# Patient Record
Sex: FEMALE | Race: OTHER | NOT HISPANIC OR LATINO | URBAN - METROPOLITAN AREA
[De-identification: names, ages, dates, MRNs, and addresses within clinical notes are randomized per-mention and may not be internally consistent; named-entity substitution may affect disease eponyms.]

---

## 2017-01-18 ENCOUNTER — EMERGENCY (EMERGENCY)
Facility: HOSPITAL | Age: 57
LOS: 1 days | Discharge: HOME | End: 2017-01-18
Attending: EMERGENCY MEDICINE
Payer: SELF-PAY

## 2017-01-18 VITALS
WEIGHT: 141.98 LBS | DIASTOLIC BLOOD PRESSURE: 61 MMHG | HEART RATE: 65 BPM | TEMPERATURE: 98 F | HEIGHT: 61 IN | RESPIRATION RATE: 18 BRPM | SYSTOLIC BLOOD PRESSURE: 144 MMHG | OXYGEN SATURATION: 100 %
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
## 2017-01-18 DIAGNOSIS — M54.9 DORSALGIA, UNSPECIFIED: ICD-10-CM

## 2017-01-18 DIAGNOSIS — I10 ESSENTIAL (PRIMARY) HYPERTENSION: ICD-10-CM
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
## 2017-01-18 DIAGNOSIS — M54.2 CERVICALGIA: ICD-10-CM

## 2017-01-18 DIAGNOSIS — M54.5 LOW BACK PAIN: ICD-10-CM
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
## 2017-01-18 PROCEDURE — 73502 X-RAY EXAM HIP UNI 2-3 VIEWS: CPT

## 2017-01-18 PROCEDURE — 72125 CT NECK SPINE W/O DYE: CPT

## 2017-01-18 PROCEDURE — 72125 CT NECK SPINE W/O DYE: CPT | Mod: 26

## 2017-01-18 PROCEDURE — 99283 EMERGENCY DEPT VISIT LOW MDM: CPT

## 2017-01-18 PROCEDURE — 70450 CT HEAD/BRAIN W/O DYE: CPT

## 2017-01-18 PROCEDURE — 70450 CT HEAD/BRAIN W/O DYE: CPT | Mod: 26

## 2017-01-18 PROCEDURE — 73502 X-RAY EXAM HIP UNI 2-3 VIEWS: CPT | Mod: 26,LT

## 2017-01-18 PROCEDURE — 99284 EMERGENCY DEPT VISIT MOD MDM: CPT | Mod: 25

## 2017-01-18 NOTE — ED PROVIDER NOTE - OBJECTIVE STATEMENT
57 y/o F pt with PMHx of HTN and chronic back pain  presents to the ED c/o head injury s/p fall x 1455 today. Pt also notes pain to L neck, L hand, and L lower back radiating to buttock. Pt reports she was at work and when she turned she fell hitting her head on the shower door. Pt is unsure if she tripped on anything she just suddenly fell. Pt was able to ambulate after the fall and took 2 ibuprofen PTA. Pt denies lightheadedness, dizziness, CP, palpitations, nausea, visual changes, blood o clear drainage from nose, blood thinner use or any other complaints at this time. Allergies: sulfacetamide sodium-sulfur 57 y/o F pt with PMHx of HTN and chronic back pain  presents to the ED c/o head injury s/p fall x 1455 today. Pt also notes pain to R neck and L lower back radiating to buttock. Pt reports she was at work and when she turned she fell hitting her head on the shower door. Pt is unsure if she tripped on anything she just suddenly fell. Pt was able to ambulate after the fall and took 2 ibuprofen PTA. Pt denies lightheadedness, dizziness, CP, palpitations, nausea, visual changes, blood o clear drainage from nose, blood thinner use or any other complaints at this time. Allergies: sulfacetamide sodium-sulfur

## 2017-01-18 NOTE — ED PROVIDER NOTE - NS ED MD SCRIBE ATTENDING SCRIBE SECTIONS
VITAL SIGNS( Pullset)/PHYSICAL EXAM/HIV/HISTORY OF PRESENT ILLNESS/DISPOSITION/REVIEW OF SYSTEMS/PAST MEDICAL/SURGICAL/SOCIAL HISTORY

## 2017-01-18 NOTE — ED PROVIDER NOTE - CARE PLAN
Principal Discharge DX:	Neck pain, acute  Secondary Diagnosis:	Acute left-sided low back pain without sciatica

## 2017-01-18 NOTE — ED PROVIDER NOTE - PROGRESS NOTE DETAILS
The scribes documentation has been prepared under my direct and personally reviewed by me in its entirety. I confirm that the note above accurately reflects all work, treatment, procedures, and medical decision making performed by me [Eileen Raphael NP]. Pt ambulating wo assistance at time of discharge, discussed incidental CT neck findings, no acute findings, suggested following up with Ortho/spine, printed out results. Instructed to take IBU OTC x5 days. Daughter at bedside.

## 2017-01-18 NOTE — ED PROVIDER NOTE - PHYSICAL EXAMINATION
MSKL: R paraspinal cervical TTP;  no midline TTP;  L paraspinal lumbar TTP; L side positive log roll; Antalgic gait; difficulty bearing weight/ ambulating without assistannce  HEMNT: occipital point tenderness; no dietrich sign; no raccoon eye; bilateral TMs difficult to see due to cerumen build up

## 2017-01-18 NOTE — ED ADULT TRIAGE NOTE - CHIEF COMPLAINT QUOTE
Head injury s/p fall x 2.55 pm. 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 Also have pain left shoulder, buttock and left hand. Has bump back of head. NO loc.

## 2017-01-18 NOTE — ED PROVIDER NOTE - ATTENDING CONTRIBUTION TO CARE
54 yo F s/p fall at work struck head on ground, deneis LOC, + left tenderness.   GCS 15, no raccoon eyes, no Battles sign, no scalp step off deformities.   left hip TTP,  no bony deformities, no leg length discrepancy, femoral and pedal pulses intact, cap refill  < 2 secs. 56 yo F s/p fall at work struck head on ground, deneis LOC, + left tenderness.   GCS 15, no raccoon eyes, no Battles sign, no scalp step off deformities.   left hip TTP,  no bony deformities, no leg length discrepancy, femoral and pedal pulses intact, cap refill  < 2 secs.  Pt is well appearing walking with normal gait, stable for discharge and follow up with medical doctor. Pt educated on care and need for follow up. Discussed anticipatory guidance and return precautions. Questions answered. I had a detailed discussion with the patient and/or guardian regarding the historical points, exam findings, and any diagnostic results supporting the discharge diagnosis. Rx IBU f/u with PMD.

## 2017-01-18 NOTE — ED PROVIDER NOTE - MEDICAL DECISION MAKING DETAILS
Pt with point occipital TTP, L lumbar pain and neck pain s/p fall of unknown mechanism. Pt unsure of LOC. Pt able to ambulate at the scene with assitance. Pt was given 400mg ibuprofen PTA. Plan to obtain CT head-neck, X-ray of hip to r/o fracture. Pt refusing percocet at this time. No signs of basilar skull fracture. GCS 15 A&O3 Pt with occipital point tenderness, L lumbar pain (no sciatica), neck pain and pain with ambulation s/p mechanical fall, pt uncertain of LOC, witnessed by other employee's. Denies cardiac symptomatology  or syncope. Pt able to ambulate at the scene with assistance. Pt was given 400mg ibuprofen PTA. Plan to obtain CT head-neck, X-ray of hip to r/o fracture. Pt refusing percocet at this time. No signs of basilar skull fracture. GCS 15 A&O3

## 2017-04-02 ENCOUNTER — EMERGENCY (EMERGENCY)
Facility: HOSPITAL | Age: 57
LOS: 1 days | Discharge: HOME | End: 2017-04-02
Attending: EMERGENCY MEDICINE
Payer: SELF-PAY

## 2017-04-02 VITALS
WEIGHT: 134.92 LBS | OXYGEN SATURATION: 98 % | DIASTOLIC BLOOD PRESSURE: 84 MMHG | TEMPERATURE: 98 F | SYSTOLIC BLOOD PRESSURE: 139 MMHG | HEIGHT: 64 IN | RESPIRATION RATE: 16 BRPM | HEART RATE: 61 BPM

## 2017-04-02 DIAGNOSIS — I10 ESSENTIAL (PRIMARY) HYPERTENSION: ICD-10-CM

## 2017-04-02 DIAGNOSIS — Z91.81 HISTORY OF FALLING: ICD-10-CM
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
## 2017-04-02 DIAGNOSIS — Z88.2 ALLERGY STATUS TO SULFONAMIDES: ICD-10-CM

## 2017-04-02 DIAGNOSIS — S49.92XA UNSPECIFIED INJURY OF LEFT SHOULDER AND UPPER ARM, INITIAL ENCOUNTER: ICD-10-CM
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
## 2017-04-02 PROBLEM — M54.9 DORSALGIA, UNSPECIFIED: Chronic | Status: ACTIVE | Noted: 2017-01-18

## 2017-04-02 PROCEDURE — 99283 EMERGENCY DEPT VISIT LOW MDM: CPT

## 2017-04-02 PROCEDURE — 73030 X-RAY EXAM OF SHOULDER: CPT | Mod: 26,LT

## 2017-04-02 PROCEDURE — 70450 CT HEAD/BRAIN W/O DYE: CPT | Mod: 26

## 2017-04-02 PROCEDURE — 73030 X-RAY EXAM OF SHOULDER: CPT

## 2017-04-02 PROCEDURE — 70450 CT HEAD/BRAIN W/O DYE: CPT

## 2017-04-02 PROCEDURE — 72125 CT NECK SPINE W/O DYE: CPT | Mod: 26

## 2017-04-02 PROCEDURE — 72125 CT NECK SPINE W/O DYE: CPT

## 2017-04-02 PROCEDURE — 99284 EMERGENCY DEPT VISIT MOD MDM: CPT | Mod: 25

## 2017-04-02 RX ORDER — IBUPROFEN 200 MG
600 TABLET ORAL ONCE
Qty: 0 | Refills: 0 | Status: COMPLETED | OUTPATIENT
Start: 2017-04-02 | End: 2017-04-02

## 2017-04-02 RX ORDER — IBUPROFEN 200 MG
1 TABLET ORAL
Qty: 20 | Refills: 0 | OUTPATIENT
Start: 2017-04-02 | End: 2017-04-07

## 2017-04-02 RX ORDER — OXYCODONE HYDROCHLORIDE 5 MG/1
1 TABLET ORAL
Qty: 20 | Refills: 0 | OUTPATIENT
Start: 2017-04-02 | End: 2017-04-07

## 2017-04-02 RX ADMIN — Medication 600 MILLIGRAM(S): at 17:15

## 2017-04-02 RX ADMIN — Medication 600 MILLIGRAM(S): at 16:38

## 2017-04-02 NOTE — ED PROVIDER NOTE - CHPI ED SYMPTOMS NEG
no vomiting, CP, SOB, LOC, HA, diaphoresis, shaking, dizziness, visual aura, nuchal rigidity, neck stiffness, urine/bowel incontinence, changes in speech/vision/hearing, numbness, weakness, tingling, use of drugs/ETOH/cigarettes

## 2017-04-02 NOTE — ED PROVIDER NOTE - CONDUCTED A DETAILED DISCUSSION WITH PATIENT AND/OR GUARDIAN REGARDING, MDM
return to ED if symptoms worsen, persist or questions arise/need for outpatient follow-up return to ED if symptoms worsen, persist or questions arise/need for outpatient follow-up/radiology results

## 2017-04-02 NOTE — ED PROVIDER NOTE - OBJECTIVE STATEMENT
55 y/o female with significant PMHx of falls BIB EMS to the ED c/o L shoulder pain x today. 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 EMS reports pt had recent fall on head, which pt had MRI s/p fall with everything wnl. EMS reports possible fracture of L shoulder. EMS reports pt hit head lightly, and now reports L ear pain. 57 y/o female with significant PMHx of falls, chronic back pain and HTN, BIB EMS to the ED c/o L shoulder pain and neck pain s/p mechanical fall x today. 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 EMS reports pt hit head lightly, and also reports L ear pain; pt reports she is unsure if she hit head. Pt reports L hip pain radiating to L groin.  EMS reports pt had recent fall on head, which pt had MRI s/p fall with everything wnl. Pt denies vomiting, CP, SOB, LOC, HA, diaphoresis, shaking, dizziness, visual aura, nuchal rigidity, urine/bowel incontinence, changes in speech/vision/hearing, numbness, weakness, tingling, use of drugs/ETOH/cigarettes, or any other complaints.

## 2017-04-02 NOTE — ED PROVIDER NOTE - NS ED MD SCRIBE ATTENDING SCRIBE SECTIONS
PHYSICAL EXAM/DISPOSITION/PAST MEDICAL/SURGICAL/SOCIAL HISTORY/HIV/HISTORY OF PRESENT ILLNESS/VITAL SIGNS( Pullset)/REVIEW OF SYSTEMS

## 2017-04-02 NOTE — ED PROVIDER NOTE - PHYSICAL EXAMINATION
MSK: (+) C6 midline tenderness to palpation, (+) pain to rotation of L shoulder, FROM of all other joints, pulses intact, cap refill <2secs  NEURO: NV intact, strength 5/5, sensation intact

## 2017-04-02 NOTE — ED PROVIDER NOTE - MEDICAL DECISION MAKING DETAILS
Pt with L shoulder pain s/p mechanical fall at fall. Attempted to stop shoulder with L arm, now c/o neck pain and L shoulder pain. Will order x-ray and CT head to r/o ICH or fracture, give pain medication (percocet) and reassess.

## 2023-03-17 NOTE — ED PROVIDER NOTE - EXITCARE/DISCHARGE INSTRUCTIONS
Excisional Biopsy Left Breast Mass Launch Exit[facility] and print the 'Prescriptions from this Visit' Report

## 2024-01-03 NOTE — ED PROVIDER NOTE - DIAGNOSIS COUNSELING, MDM
n/a conducted a detailed discussion... I had a detailed discussion with the patient and/or guardian regarding the historical points, exam findings, and any diagnostic results supporting the discharge/admit diagnosis.

## 2025-06-11 NOTE — ED PROVIDER NOTE - CROS ED NEURO NEG
[Yes] : Yes [Monthly or less (1 pt)] : Monthly or less (1 point) [Never (0 pts)] : Never (0 points) [1 or 2 (0 pts)] : 1 or 2 (0 points) [Never] : Never [0] : 2) Feeling down, depressed, or hopeless: Not at all (0) [PHQ-2 Negative - No further assessment needed] : PHQ-2 Negative - No further assessment needed [de-identified] : rare alcohol use  [Audit-CScore] : 1 [de-identified] : Walking  [de-identified] : Diet is generally good  [[id number]] : 0 [ColonoscopyDate] : 07/2023 [ColonoscopyComments] : Hemorrhoids, due 2033  no dizziness, no lightheadedness